# Patient Record
Sex: FEMALE | Race: WHITE
[De-identification: names, ages, dates, MRNs, and addresses within clinical notes are randomized per-mention and may not be internally consistent; named-entity substitution may affect disease eponyms.]

---

## 2017-04-22 ENCOUNTER — HOSPITAL ENCOUNTER (EMERGENCY)
Dept: HOSPITAL 54 - ER | Age: 47
Discharge: HOME | End: 2017-04-22
Payer: SELF-PAY

## 2017-04-22 VITALS — HEIGHT: 67 IN | BODY MASS INDEX: 27.47 KG/M2 | WEIGHT: 175 LBS

## 2017-04-22 VITALS — SYSTOLIC BLOOD PRESSURE: 134 MMHG | DIASTOLIC BLOOD PRESSURE: 77 MMHG

## 2017-04-22 DIAGNOSIS — Z90.49: ICD-10-CM

## 2017-04-22 DIAGNOSIS — L02.212: Primary | ICD-10-CM

## 2017-04-22 DIAGNOSIS — E89.0: ICD-10-CM

## 2017-04-22 PROCEDURE — A6402 STERILE GAUZE <= 16 SQ IN: HCPCS

## 2017-04-22 PROCEDURE — A4606 OXYGEN PROBE USED W OXIMETER: HCPCS

## 2017-04-22 PROCEDURE — 99283 EMERGENCY DEPT VISIT LOW MDM: CPT

## 2017-04-22 PROCEDURE — 10060 I&D ABSCESS SIMPLE/SINGLE: CPT

## 2017-04-22 PROCEDURE — Z7610: HCPCS

## 2017-04-22 PROCEDURE — A6407 PACKING STRIPS, NON-IMPREG: HCPCS

## 2017-04-22 PROCEDURE — A6403 STERILE GAUZE>16 <= 48 SQ IN: HCPCS

## 2017-04-24 ENCOUNTER — HOSPITAL ENCOUNTER (EMERGENCY)
Dept: HOSPITAL 54 - ER | Age: 47
Discharge: HOME | End: 2017-04-24
Payer: SELF-PAY

## 2017-04-24 DIAGNOSIS — Z53.21: Primary | ICD-10-CM

## 2018-12-12 ENCOUNTER — HOSPITAL ENCOUNTER (EMERGENCY)
Dept: HOSPITAL 54 - ER | Age: 48
Discharge: HOME | End: 2018-12-12
Payer: COMMERCIAL

## 2018-12-12 VITALS
HEIGHT: 67 IN | BODY MASS INDEX: 28.41 KG/M2 | DIASTOLIC BLOOD PRESSURE: 69 MMHG | SYSTOLIC BLOOD PRESSURE: 127 MMHG | WEIGHT: 181 LBS

## 2018-12-12 DIAGNOSIS — L72.3: Primary | ICD-10-CM

## 2018-12-12 DIAGNOSIS — Z90.89: ICD-10-CM

## 2018-12-12 DIAGNOSIS — Z90.49: ICD-10-CM

## 2018-12-12 PROCEDURE — 10060 I&D ABSCESS SIMPLE/SINGLE: CPT

## 2018-12-12 PROCEDURE — Z7610: HCPCS

## 2018-12-12 PROCEDURE — A6402 STERILE GAUZE <= 16 SQ IN: HCPCS

## 2018-12-12 PROCEDURE — 99283 EMERGENCY DEPT VISIT LOW MDM: CPT

## 2018-12-12 PROCEDURE — A4606 OXYGEN PROBE USED W OXIMETER: HCPCS

## 2018-12-12 NOTE — NUR
Pt came in for an abcess in her mid lower back, is taking antibiotics for it.  
She is A, O/4, moves all extremities without difficulty. Boil in her back 
appears red, swollen, painful to the touch. Awaiting to be seen by MD.

## 2020-08-08 ENCOUNTER — HOSPITAL ENCOUNTER (EMERGENCY)
Dept: HOSPITAL 54 - ER | Age: 50
Discharge: HOME | End: 2020-08-08
Payer: COMMERCIAL

## 2020-08-08 VITALS — WEIGHT: 180 LBS | HEIGHT: 67 IN | BODY MASS INDEX: 28.25 KG/M2

## 2020-08-08 VITALS — DIASTOLIC BLOOD PRESSURE: 81 MMHG | SYSTOLIC BLOOD PRESSURE: 122 MMHG

## 2020-08-08 DIAGNOSIS — Z90.49: ICD-10-CM

## 2020-08-08 DIAGNOSIS — L02.212: Primary | ICD-10-CM

## 2020-08-08 DIAGNOSIS — Z98.890: ICD-10-CM

## 2024-03-14 ENCOUNTER — OFFICE (OUTPATIENT)
Dept: URBAN - METROPOLITAN AREA CLINIC 67 | Facility: CLINIC | Age: 54
End: 2024-03-14

## 2024-03-14 VITALS — SYSTOLIC BLOOD PRESSURE: 120 MMHG | DIASTOLIC BLOOD PRESSURE: 80 MMHG | WEIGHT: 185 LBS | HEIGHT: 67 IN

## 2024-03-14 DIAGNOSIS — R19.4 CHANGE IN BOWEL HABITS: ICD-10-CM

## 2024-03-14 DIAGNOSIS — Z98.890 STATUS POST COLONOSCOPY: ICD-10-CM

## 2024-03-14 DIAGNOSIS — R17 TOTAL BILIRUBIN, ELEVATED: ICD-10-CM

## 2024-03-14 DIAGNOSIS — K76.0 FATTY LIVER: ICD-10-CM

## 2024-03-14 DIAGNOSIS — Z90.49 STATUS POST CHOLECYSTECTOMY: ICD-10-CM

## 2024-03-14 PROCEDURE — 99204 OFFICE O/P NEW MOD 45 MIN: CPT | Performed by: NURSE PRACTITIONER

## 2024-03-14 NOTE — SERVICEHPINOTES
This is a   53   year old  female   seen   in consultation at the request of Dr. SVETA HUGGINS  . Patient was referred to Dr. Tamayo for elevated bilirubin levels.  Concerned because she says that her bilirubin was never elevated before.  She is pending abdominal ultrasound on March 20 we will track.  Her bilirubin was 1.4 on February 14, 2024 all other hepatic function tests were normal and her acute hepatitis panel was negative her CBC was within normal limits as well.
carmel burt   Says that she had yellow stool in the past and at times she has a bad taste in her mouth.
carmel She also tells me she has a history of fatty liver.
carmel burtFor right upper quadrant abdominal discomfort she had extensive evaluation by another gastroenterologist 10 years ago.   Status post cholecystectomy.
carmel burt Her last colonoscopy was in December 2023 with Dr. Cornell who no longer takes her insurancebr
carmel

## 2024-03-14 NOTE — SERVICENOTES
Please note that a dictation software was used to generate the above report. Unfortunately, software programs can generate inadvertent errors during dictation.  Wording of above report should be interpreted with caution and with taking context into consideration. Feel free to contact us for any clarification if needed.

## 2025-06-09 ENCOUNTER — OFFICE (OUTPATIENT)
Dept: URBAN - METROPOLITAN AREA CLINIC 67 | Facility: CLINIC | Age: 55
End: 2025-06-09

## 2025-06-09 VITALS — HEIGHT: 67 IN
